# Patient Record
Sex: MALE | Race: WHITE | NOT HISPANIC OR LATINO | URBAN - METROPOLITAN AREA
[De-identification: names, ages, dates, MRNs, and addresses within clinical notes are randomized per-mention and may not be internally consistent; named-entity substitution may affect disease eponyms.]

---

## 2019-11-11 ENCOUNTER — EMERGENCY (EMERGENCY)
Facility: HOSPITAL | Age: 64
LOS: 1 days | Discharge: HOME | End: 2019-11-11
Attending: EMERGENCY MEDICINE | Admitting: EMERGENCY MEDICINE
Payer: COMMERCIAL

## 2019-11-11 VITALS
HEART RATE: 91 BPM | RESPIRATION RATE: 18 BRPM | SYSTOLIC BLOOD PRESSURE: 184 MMHG | DIASTOLIC BLOOD PRESSURE: 94 MMHG | OXYGEN SATURATION: 97 % | TEMPERATURE: 98 F | WEIGHT: 225.09 LBS | HEIGHT: 68 IN

## 2019-11-11 PROCEDURE — 99282 EMERGENCY DEPT VISIT SF MDM: CPT

## 2019-11-11 PROCEDURE — 99283 EMERGENCY DEPT VISIT LOW MDM: CPT

## 2019-11-11 RX ORDER — ASPIRIN/CALCIUM CARB/MAGNESIUM 324 MG
1 TABLET ORAL
Qty: 0 | Refills: 0 | DISCHARGE

## 2019-11-11 NOTE — ED PROVIDER NOTE - OBJECTIVE STATEMENT
65yo male who presents with urinary retention pt was at the game today and started to feel pressure with dribbling, and since 8pm has not been able to urinate, no fever, vomiting, pt has no hx of BPH, no other compalints

## 2019-11-11 NOTE — ED PROVIDER NOTE - PSYCHIATRIC, MLM
Alert and oriented to person, place, time/situation. normal mood and affect. no apparent risk to self or others.
negative...

## 2019-11-11 NOTE — ED PROVIDER NOTE - PROGRESS NOTE DETAILS
pt put out 900cc urine, clear, feeling much better, pt to be d/c home follow up with urology, return if any symtpoms worsen

## 2019-11-11 NOTE — ED ADULT NURSE NOTE - CHIEF COMPLAINT QUOTE
dribbling since 2000/ suprapubic pain   no history of urinary retention dribbling since 2000/ suprapubic pain   no history of urinary retention  recent prostate exam unremarkable

## 2019-11-11 NOTE — ED ADULT TRIAGE NOTE - CHIEF COMPLAINT QUOTE
dribbling since 2000/ suprapubic pain   no history of urinary retention dribbling since 2000/ suprapubic pain   no history of urinary retention  recent prostate exam, unremarkable

## 2019-11-11 NOTE — ED PROVIDER NOTE - PATIENT PORTAL LINK FT
You can access the FollowMyHealth Patient Portal offered by  by registering at the following website: [email or web address]. By joining [employer]’s FollowMyHealth portal, you will also be able to view your health information using other applications (apps) compatible with our system.

## 2021-02-10 PROBLEM — E78.00 PURE HYPERCHOLESTEROLEMIA, UNSPECIFIED: Chronic | Status: ACTIVE | Noted: 2019-11-11

## 2021-02-18 PROBLEM — Z00.00 ENCOUNTER FOR PREVENTIVE HEALTH EXAMINATION: Status: ACTIVE | Noted: 2021-02-18

## 2021-02-24 ENCOUNTER — APPOINTMENT (OUTPATIENT)
Dept: OTOLARYNGOLOGY | Facility: CLINIC | Age: 66
End: 2021-02-24
Payer: COMMERCIAL

## 2021-02-24 VITALS
WEIGHT: 217 LBS | HEIGHT: 68 IN | BODY MASS INDEX: 32.89 KG/M2 | TEMPERATURE: 97.1 F | HEART RATE: 78 BPM | DIASTOLIC BLOOD PRESSURE: 78 MMHG | SYSTOLIC BLOOD PRESSURE: 126 MMHG

## 2021-02-24 DIAGNOSIS — N40.0 BENIGN PROSTATIC HYPERPLASIA WITHOUT LOWER URINARY TRACT SYMPMS: ICD-10-CM

## 2021-02-24 DIAGNOSIS — Z78.9 OTHER SPECIFIED HEALTH STATUS: ICD-10-CM

## 2021-02-24 DIAGNOSIS — Z80.9 FAMILY HISTORY OF MALIGNANT NEOPLASM, UNSPECIFIED: ICD-10-CM

## 2021-02-24 DIAGNOSIS — E78.00 PURE HYPERCHOLESTEROLEMIA, UNSPECIFIED: ICD-10-CM

## 2021-02-24 PROCEDURE — 99072 ADDL SUPL MATRL&STAF TM PHE: CPT

## 2021-02-24 PROCEDURE — 69210 REMOVE IMPACTED EAR WAX UNI: CPT

## 2021-02-24 PROCEDURE — 99213 OFFICE O/P EST LOW 20 MIN: CPT | Mod: 25

## 2021-02-24 RX ORDER — ROSUVASTATIN CALCIUM 5 MG/1
TABLET, FILM COATED ORAL
Refills: 0 | Status: ACTIVE | COMMUNITY

## 2021-02-24 RX ORDER — TAMSULOSIN HYDROCHLORIDE 0.4 MG/1
CAPSULE ORAL
Refills: 0 | Status: ACTIVE | COMMUNITY

## 2021-02-24 NOTE — ADDENDUM
[FreeTextEntry1] : Documented by Shelley Franks acting as scribe for Dr. Valencia on 02/24/2021.\par \par All Medical record entries made by the Scribe were at my, Dr. Valencia, direction and personally dictated by me on 02/24/2021 . I have reviewed the chart and agree that the record accurately reflects my personal performance of the history, physical exam, assessment and plan. I have also personally directed, reviewed, and agreed with the discharge instructions.

## 2021-02-24 NOTE — CONSULT LETTER
[Dear  ___] : Dear  [unfilled], [Courtesy Letter:] : I had the pleasure of seeing your patient, [unfilled], in my office today. [Please see my note below.] : Please see my note below. [Consult Closing:] : Thank you very much for allowing me to participate in the care of this patient.  If you have any questions, please do not hesitate to contact me. [Sincerely,] : Sincerely, [FreeTextEntry3] : Claude Valencia MD FACS

## 2021-02-24 NOTE — HISTORY OF PRESENT ILLNESS
[de-identified] : The patient presents with h/o sleep apnea for which he uses AutoPAP. Pt reports doing well. He uses q-tips occasionally.

## 2021-02-24 NOTE — PHYSICAL EXAM
[Hearing Amaya Test (Tuning Fork On Forehead)] : no lateralization of tone [] : septum deviated to the left [Normal] : no rashes [FreeTextEntry8] : narrow, cerumen impaction removed via curettage, peroxide drops and suction [FreeTextEntry9] : narrow, cerumen impaction removed via curettage, peroxide drops and suction [FreeTextEntry1] : tonsils class 1, uvula edema, type 3 oral cavity [FreeTextEntry2] : sinuses nontender to percussion

## 2021-02-24 NOTE — ASSESSMENT
[FreeTextEntry1] : Reviewed and reconciled medications, allergies, PMHx, PSHx, SocHx, FMHx.\par \par h/o sleep apnea - uses AutoPAP\par bilateral cerumen impactions\par \par compliance report 11/24/2020-2/21/2021: compliant, AHI 1.2, used 92% of the time, avg use 5 hours, avg pressure 7 cm\par \par Plan:\par Cerumen removed. Reviewed compliance report. Continue AutoPAP. FU 6 months for cerumen removal.

## 2021-09-22 ENCOUNTER — APPOINTMENT (OUTPATIENT)
Dept: OTOLARYNGOLOGY | Facility: CLINIC | Age: 66
End: 2021-09-22
Payer: COMMERCIAL

## 2021-09-22 VITALS
SYSTOLIC BLOOD PRESSURE: 118 MMHG | HEIGHT: 68 IN | HEART RATE: 63 BPM | BODY MASS INDEX: 33.34 KG/M2 | DIASTOLIC BLOOD PRESSURE: 77 MMHG | WEIGHT: 220 LBS

## 2021-09-22 PROCEDURE — 99213 OFFICE O/P EST LOW 20 MIN: CPT | Mod: 25

## 2021-09-22 PROCEDURE — 69210 REMOVE IMPACTED EAR WAX UNI: CPT

## 2021-09-22 NOTE — PHYSICAL EXAM
[Hearing Loss Right Only] : normal [Hearing Loss Left Only] : normal [FreeTextEntry8] : cerumen impaction removed by curette and suction, narrowed distal canal [FreeTextEntry9] : cerumen impaction removed by curette and suction, narrowed distal canal [] : septum deviated bilaterally [de-identified] : penelopeamed [Normal] : lingual tonsils are normal [de-identified] : elongated uvula. type 3 oral cavity [de-identified] : small tonsils

## 2021-09-22 NOTE — ASSESSMENT
[FreeTextEntry1] : pt with sleep apnea and recalled machine. trying to get new machine.  discussed inspire in combo with uvula reduction but pt not interested in surgery. compliance report reviewed\par \par cerumen bilaterally removed by curettage and suction\par \par fu 6 months continue cpap

## 2021-09-22 NOTE — HISTORY OF PRESENT ILLNESS
[de-identified] : Patient on cpap using a recalled machine. without machine tired.  Trying to get machine from insurance. Interested in possible inspire. Does not want surgery however. \par \par compliance report reviewed with pt.using about 80% for 4.5 hours overall with good reduction of ahi to 1.6

## 2022-03-25 ENCOUNTER — APPOINTMENT (OUTPATIENT)
Dept: OTOLARYNGOLOGY | Facility: CLINIC | Age: 67
End: 2022-03-25
Payer: COMMERCIAL

## 2022-03-25 ENCOUNTER — TRANSCRIPTION ENCOUNTER (OUTPATIENT)
Age: 67
End: 2022-03-25

## 2022-03-25 VITALS
HEIGHT: 68 IN | HEART RATE: 65 BPM | BODY MASS INDEX: 34.1 KG/M2 | DIASTOLIC BLOOD PRESSURE: 77 MMHG | WEIGHT: 225 LBS | SYSTOLIC BLOOD PRESSURE: 135 MMHG

## 2022-03-25 PROCEDURE — 69210 REMOVE IMPACTED EAR WAX UNI: CPT

## 2022-03-25 PROCEDURE — 99213 OFFICE O/P EST LOW 20 MIN: CPT | Mod: 25

## 2022-03-25 NOTE — ADDENDUM
[FreeTextEntry1] : Documented by Lj Up acting as scribe for Dr. Valencia on 03/25/2022. \par \par All Medical record entries made by the scribe were at my. Dr. Valencia direction and personally dictated by me on 03/25/2022. I have reviewed the chart and agree that the record accurately reflects my personal performance of the history, physical exam, assessment and plan. I have also personally directed, reviewed, and agreed with the discharge instructions.

## 2022-03-25 NOTE — HISTORY OF PRESENT ILLNESS
[de-identified] : The patient presents with h/o sleep apnea - using CPAP intermittently. machine was recalled.  did not qualify per patient for immediate replacement but will review email again. Is tired the days he doesn't use machine. The patient presents today with ear clogging. His wife complains about his hearing.  Pt reports using the CPAP for 3 years now.

## 2022-03-25 NOTE — PHYSICAL EXAM
[Hearing Amaya Test (Tuning Fork On Forehead)] : no lateralization of tone [] : septum deviated to the left [Midline] : trachea located in midline position [Normal] : orientation to person, place, and time: normal [Hearing Loss Right Only] : normal [Hearing Loss Left Only] : normal [FreeTextEntry8] : cerumen impaction removed via curettage - narrow canal  [FreeTextEntry9] : cerumen impaction removed via curettage  [de-identified] : mildly inflamed turbs - little blood on left septum  [de-identified] : edematous pale uvula  [de-identified] : class 1 and lateral  [de-identified] : type 3 oral cavity  [FreeTextEntry2] : sinuses nontender to percussion sensations intact

## 2022-06-24 ENCOUNTER — APPOINTMENT (OUTPATIENT)
Dept: ORTHOPEDIC SURGERY | Facility: CLINIC | Age: 67
End: 2022-06-24
Payer: COMMERCIAL

## 2022-06-24 VITALS — WEIGHT: 225 LBS | HEIGHT: 68 IN | BODY MASS INDEX: 34.1 KG/M2

## 2022-06-24 DIAGNOSIS — M65.4 RADIAL STYLOID TENOSYNOVITIS [DE QUERVAIN]: ICD-10-CM

## 2022-06-24 PROCEDURE — 99213 OFFICE O/P EST LOW 20 MIN: CPT | Mod: 25

## 2022-06-24 PROCEDURE — 20550 NJX 1 TENDON SHEATH/LIGAMENT: CPT

## 2022-06-24 NOTE — PHYSICAL EXAM
[Left] : left hand [First Dorsal Compartment] : first dorsal compartment [1st] : 1st [CMC Joint] : CMC joint [] : positive Finkelstein's [de-identified] : mild first CMC tenderness

## 2022-06-24 NOTE — PROCEDURE
[Tendon Sheath] : tendon sheath [Left] : of the left [De Juanita's] : arianne urbano's [Pain] : pain [Inflammation] : inflammation [Alcohol] : alcohol [Ethyl Chloride sprayed topically] : ethyl chloride sprayed topically [Sterile technique used] : sterile technique used [___ cc    1%] : Lidocaine ~Vcc of 1%  [___ cc    10mg] : Triamcinolone (Kenalog) ~Vcc of 10 mg

## 2022-06-24 NOTE — HISTORY OF PRESENT ILLNESS
[8] : 8 [0] : 0 [Sharp] : sharp [Intermittent] : intermittent [Nothing helps with pain getting better] : Nothing helps with pain getting better [de-identified] : 65 year old male followed for LEFT wrist DeQuervain's synovitis. 4 months s/p third CSI with significant relief. Acute\par exacerbation of symptoms.\par   [] : no [FreeTextEntry1] : LT hand and Wrist [FreeTextEntry5] : Hx of tendonitis

## 2022-10-03 ENCOUNTER — APPOINTMENT (OUTPATIENT)
Dept: OTOLARYNGOLOGY | Facility: CLINIC | Age: 67
End: 2022-10-03

## 2022-10-03 VITALS
BODY MASS INDEX: 33.49 KG/M2 | DIASTOLIC BLOOD PRESSURE: 69 MMHG | HEART RATE: 62 BPM | WEIGHT: 221 LBS | HEIGHT: 68 IN | SYSTOLIC BLOOD PRESSURE: 112 MMHG

## 2022-10-03 PROCEDURE — 99213 OFFICE O/P EST LOW 20 MIN: CPT | Mod: 25

## 2022-10-03 PROCEDURE — 69210 REMOVE IMPACTED EAR WAX UNI: CPT

## 2022-10-03 NOTE — CONSULT LETTER
[Dear  ___] : Dear  [unfilled], [Courtesy Letter:] : I had the pleasure of seeing your patient, [unfilled], in my office today. [Please see my note below.] : Please see my note below. [Consult Closing:] : Thank you very much for allowing me to participate in the care of this patient.  If you have any questions, please do not hesitate to contact me. [Sincerely,] : Sincerely, [FreeTextEntry1] : Claude Valencia MD FACS

## 2022-10-03 NOTE — HISTORY OF PRESENT ILLNESS
[de-identified] : Pt. with h/o sleep apnea - using CPAP intermittently presents today for a 6 month follow up. Patient states he just got a new CPAP about 2/3 weeks ago (uses nasal pillows). He states if he wakes up in the middle of the night to use the bathroom he doesn’t wanna put the CPAP back on. Patient states he uses Q-tips occasionally.

## 2022-10-03 NOTE — PHYSICAL EXAM
[Hearing Amaya Test (Tuning Fork On Forehead)] : no lateralization of tone [Midline] : trachea located in midline position [Normal] : no rashes [FreeTextEntry8] : cerumen impaction removed via curettage  [FreeTextEntry1] : -deviated septum left greater than right along the floor - non obstructive [de-identified] : class 1  [de-identified] : type 3 oral cavity [FreeTextEntry2] : sensations intact. sinuses nontender to percussion  [de-identified] : GAIL

## 2022-10-03 NOTE — ASSESSMENT
[FreeTextEntry1] : Reviewed and reconciled medications, allergies, PMHx, PSHx, SocHx, FMHx \par \par Pt. with h/o sleep apnea - using CPAP intermittently presents today for a 6 month follow up. Patient states he just got a new CPAP about 2/3 weeks ago (uses nasal pillows). He states if he wakes up in the middle of the night to use the bathroom he doesn’t wanna put the CPAP back on. Patient states he uses Q-tips occasionally. \par \par Sleep Study 07/29/22 - 09/29/22:\par - average use 4 hours 36 minutes\par -uses 85% of the time\par - AHI 1.2\par \par Physical Exam:\par Right Ear: cerumen impaction removed via curettage\par Left Ear: cerumen impaction removed via curettage \par -deviated septum left greater than right along the floor - non obstructive\par -tonsils class 1 \par -type 3 oral cavity\par \par Plan: cerumen impaction removed b/l via curettage. No Q-Tips. FU 6 months.

## 2023-04-03 ENCOUNTER — APPOINTMENT (OUTPATIENT)
Dept: OTOLARYNGOLOGY | Facility: CLINIC | Age: 68
End: 2023-04-03
Payer: MEDICARE

## 2023-04-03 VITALS
DIASTOLIC BLOOD PRESSURE: 82 MMHG | HEART RATE: 75 BPM | BODY MASS INDEX: 34.1 KG/M2 | WEIGHT: 225 LBS | HEIGHT: 68 IN | SYSTOLIC BLOOD PRESSURE: 118 MMHG

## 2023-04-03 PROCEDURE — G0268 REMOVAL OF IMPACTED WAX MD: CPT

## 2023-04-03 PROCEDURE — 99213 OFFICE O/P EST LOW 20 MIN: CPT | Mod: 25

## 2023-04-03 PROCEDURE — 92557 COMPREHENSIVE HEARING TEST: CPT

## 2023-04-03 PROCEDURE — 92567 TYMPANOMETRY: CPT

## 2023-04-03 NOTE — PHYSICAL EXAM
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 sensations intact.

## 2023-04-03 NOTE — HISTORY OF PRESENT ILLNESS
[de-identified] : Pt presents with h/o sleep apnea - using CPAP presents today for a 6 month follow up ear cleaning and compliance report. Denies ears feeling clogged. Pt notes his wife thinks he has a loss of hearing. Pt notes he notices a decrease in hearing with background noise.

## 2023-04-03 NOTE — DATA REVIEWED
[de-identified] : \par -TYMPS: TYPE A AU \par -AD: HEARING ESSENTIALLY WNL/ BORDERLINE -8000 HZ, WITH A MILD SNHL AT 3000 HZ\par -AS: HEARING WNL/ BORDERLINE -2000 HZ TO A MILD SNHL 7430-1320 HZ\par *NOTE ASYMM 6-8KHZ*

## 2023-04-03 NOTE — ADDENDUM
[FreeTextEntry1] : Documented by Jessy Liang acting as scribe for Dr. Valencia on 04/03/2023.\par \par All Medical record entries made by the scribe were at my, Dr. Valencia,direction and personally dictated by me on 04/03/2023. I have reviewed the chart and agree that the record accurately reflects my personal performance of the history, physical exam, assessment and plan. I have also personally directed, reviewed, and agreed with the discharge instructions.

## 2023-04-03 NOTE — ASSESSMENT
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 Reviewed compliance report. Audio - results interpreted by Dr. Valencia and reviewed with the patient. Continue using CPAP. FU 6 months.

## 2023-08-04 ENCOUNTER — APPOINTMENT (OUTPATIENT)
Dept: ORTHOPEDIC SURGERY | Facility: CLINIC | Age: 68
End: 2023-08-04
Payer: MEDICARE

## 2023-08-04 VITALS — HEIGHT: 68 IN | WEIGHT: 225 LBS | BODY MASS INDEX: 34.1 KG/M2

## 2023-08-04 PROCEDURE — 99213 OFFICE O/P EST LOW 20 MIN: CPT

## 2023-08-04 PROCEDURE — 73030 X-RAY EXAM OF SHOULDER: CPT | Mod: RT

## 2023-08-04 NOTE — DISCUSSION/SUMMARY
[de-identified] : Discussed the nature of the diagnosis and risk and benefits of different modalities of treatment. X-rays reviewed.  Rotator cuff tendonitis.  Start PT.  RTO 3 weeks.

## 2023-08-04 NOTE — HISTORY OF PRESENT ILLNESS
[3] : 3 [0] : 0 [Dull/Aching] : dull/aching [Intermittent] : intermittent [Nothing helps with pain getting better] : Nothing helps with pain getting better [de-identified] : 67-year-old male presenting with RT arm weakness. He reports he wakes up in the morning having difficulty moving the arm. Pain with elevation of the shoulder. Symptoms for the past 3-4 months. No injury/trauma.  [] : no [FreeTextEntry1] : right shoulder, right elbow  [FreeTextEntry5] : Pt states he is unable to move is arms in the morning. Pt has no strength and has to sit up and hold his arm until motor function returns. Pt has little pain,

## 2023-08-04 NOTE — PHYSICAL EXAM
[Right] : right shoulder [] : no erythema [FreeTextEntry9] : internal rotation to T11  [de-identified] : pain resisted external rotation and resisted abduction.  [de-identified] : negative cross over sign   [FreeTextEntry1] : rotator cuff degenerative changes, calcification within the cuff  [TWNoteComboBox7] : active forward flexion 170 degrees [de-identified] : active abduction 180 degrees [de-identified] : external rotation 70 degrees

## 2023-08-25 ENCOUNTER — APPOINTMENT (OUTPATIENT)
Dept: ORTHOPEDIC SURGERY | Facility: CLINIC | Age: 68
End: 2023-08-25
Payer: MEDICARE

## 2023-08-25 VITALS — HEIGHT: 68 IN | BODY MASS INDEX: 34.1 KG/M2 | WEIGHT: 225 LBS

## 2023-08-25 DIAGNOSIS — M75.81 OTHER SHOULDER LESIONS, RIGHT SHOULDER: ICD-10-CM

## 2023-08-25 PROCEDURE — 99213 OFFICE O/P EST LOW 20 MIN: CPT

## 2023-08-25 NOTE — HISTORY OF PRESENT ILLNESS
[3] : 3 [0] : 0 [Dull/Aching] : dull/aching [Intermittent] : intermittent [Nothing helps with pain getting better] : Nothing helps with pain getting better [de-identified] : 67 year old male followed for Tendinitis of right rotator cuff. He has been attending PT. He has had some progress with PT. Improved ROm. He is not resolved.    [] : no [FreeTextEntry1] : right shoulder, right elbow  [FreeTextEntry5] : started PT with some improvement, reports some soreness today.

## 2023-08-25 NOTE — PHYSICAL EXAM
[Right] : right shoulder [] : no erythema [FreeTextEntry9] : internal rotation to T11  [de-identified] : pain resisted external rotation and resisted abduction.  [de-identified] : negative cross over sign   [TWNoteComboBox7] : active forward flexion 170 degrees [de-identified] : active abduction 180 degrees [de-identified] : external rotation 70 degrees

## 2023-08-25 NOTE — DISCUSSION/SUMMARY
[de-identified] : Discussed the nature of the diagnosis and risk and benefits of different modalities of treatment. Discussed continued PT vs MRI and consultation with shoulder service.  Will obtain RT shoulder MRI.  RTO with Dr. Hernadez.

## 2023-08-28 ENCOUNTER — APPOINTMENT (OUTPATIENT)
Dept: MRI IMAGING | Facility: CLINIC | Age: 68
End: 2023-08-28
Payer: MEDICARE

## 2023-08-28 PROCEDURE — 73221 MRI JOINT UPR EXTREM W/O DYE: CPT | Mod: RT

## 2023-09-01 ENCOUNTER — APPOINTMENT (OUTPATIENT)
Dept: ORTHOPEDIC SURGERY | Facility: CLINIC | Age: 68
End: 2023-09-01
Payer: MEDICARE

## 2023-09-01 VITALS — BODY MASS INDEX: 34.1 KG/M2 | HEIGHT: 68 IN | WEIGHT: 225 LBS

## 2023-09-01 DIAGNOSIS — M75.41 IMPINGEMENT SYNDROME OF RIGHT SHOULDER: ICD-10-CM

## 2023-09-01 PROCEDURE — 99214 OFFICE O/P EST MOD 30 MIN: CPT

## 2023-09-01 NOTE — IMAGING
[de-identified] : No swelling, no ecchymosis, no meme deformity Tenderness to palpation over greater tuberosity No instability or tenderness over AC joint 170/80/60/40 5-/5 supraspinatus, 5/5 infraspinatus and subscapularis; there is pain with strength testing Positive Juan test, positive impingement sign Speeds and yergason negative Motor and sensory intact distally

## 2023-09-01 NOTE — HISTORY OF PRESENT ILLNESS
[3] : 3 [1] : 2 [Dull/Aching] : dull/aching [de-identified] : 09/01/2023: Patient presents today for a new injury visit for the R shoulder. states he has been having pain from 1 month. denies any injury. was seeing dr john who sent for mri.  [] : Post Surgical Visit: no [FreeTextEntry1] : R shoulder

## 2023-09-01 NOTE — PROCEDURE
[FreeTextEntry3] : - Large joint injection was performed of the right subacromial space.  - The indication for this procedure was pain and inflammation. Patient has tried OTC's including aspirin, Ibuprofen, Aleve, etc or prescription NSAIDS, and/or exercises at home and/or physical therapy without satisfactory response, patient had decreased mobility in the joint and the risks benefits, and alternatives have been discussed, and verbal consent was obtained. The site was prepped with alcohol, betadine, ethyl chloride sprayed topically and sterile technique used.  - An injection of Betamethasone 2cc; Marcaine 5cc injected. - Patient was advised to call if redness, pain or fever occur, apply ice for 15 minutes out of every hour for the next 12-24 hours as tolerated and patient was advised to rest the joint(s) for 2 days.  - Ultrasound guidance was indicated for this patient due to prior failure or difficult injection. All ultrasound images have been permanently captured and stored accordingly in our picture archiving and communication system. Visualization of the needle and placement of injection was performed without complication.

## 2023-09-01 NOTE — ASSESSMENT
[FreeTextEntry1] : I personally reviewed and interpreted all MRI images and notes from the previous doctors.  Atraumatic right shoulder pain for approximately 2 or 3 months.  Some improvement with physical therapy but symptoms persist.  Not symptomatic enough at this time with no night pain.  Reviewed treatment options.  MRI demonstrating classic impingement syndrome with no significant rotator cuff tear aside from degenerative tearing of the rotator cuff.  We went over the findings in detail.  Corticosteroid injection administered to the subacromial space today.  Continued physical therapy advised.  Follow-up in 6 weeks to reassess.

## 2023-10-09 ENCOUNTER — APPOINTMENT (OUTPATIENT)
Dept: OTOLARYNGOLOGY | Facility: CLINIC | Age: 68
End: 2023-10-09
Payer: MEDICARE

## 2023-10-09 VITALS
WEIGHT: 225 LBS | HEART RATE: 56 BPM | DIASTOLIC BLOOD PRESSURE: 83 MMHG | SYSTOLIC BLOOD PRESSURE: 136 MMHG | HEIGHT: 68 IN | BODY MASS INDEX: 34.1 KG/M2

## 2023-10-09 PROCEDURE — 99213 OFFICE O/P EST LOW 20 MIN: CPT | Mod: 25

## 2023-10-09 PROCEDURE — 69210 REMOVE IMPACTED EAR WAX UNI: CPT

## 2024-04-08 ENCOUNTER — APPOINTMENT (OUTPATIENT)
Dept: OTOLARYNGOLOGY | Facility: CLINIC | Age: 69
End: 2024-04-08
Payer: MEDICARE

## 2024-04-08 VITALS
HEIGHT: 68 IN | WEIGHT: 230 LBS | BODY MASS INDEX: 34.86 KG/M2 | HEART RATE: 69 BPM | DIASTOLIC BLOOD PRESSURE: 80 MMHG | SYSTOLIC BLOOD PRESSURE: 124 MMHG

## 2024-04-08 DIAGNOSIS — H61.23 IMPACTED CERUMEN, BILATERAL: ICD-10-CM

## 2024-04-08 DIAGNOSIS — H90.3 SENSORINEURAL HEARING LOSS, BILATERAL: ICD-10-CM

## 2024-04-08 DIAGNOSIS — G47.33 OBSTRUCTIVE SLEEP APNEA (ADULT) (PEDIATRIC): ICD-10-CM

## 2024-04-08 DIAGNOSIS — J34.2 DEVIATED NASAL SEPTUM: ICD-10-CM

## 2024-04-08 DIAGNOSIS — H90.5 UNSPECIFIED SENSORINEURAL HEARING LOSS: ICD-10-CM

## 2024-04-08 DIAGNOSIS — K13.79 OTHER LESIONS OF ORAL MUCOSA: ICD-10-CM

## 2024-04-08 DIAGNOSIS — J31.0 CHRONIC RHINITIS: ICD-10-CM

## 2024-04-08 PROCEDURE — G0268 REMOVAL OF IMPACTED WAX MD: CPT

## 2024-04-08 PROCEDURE — 99214 OFFICE O/P EST MOD 30 MIN: CPT | Mod: 25

## 2024-04-08 PROCEDURE — 92567 TYMPANOMETRY: CPT

## 2024-04-08 PROCEDURE — 92557 COMPREHENSIVE HEARING TEST: CPT

## 2024-04-08 NOTE — PHYSICAL EXAM
[Hearing Amaya Test (Tuning Fork On Forehead)] : no lateralization of tone [] : septum deviated bilaterally [Midline] : trachea located in midline position [Normal] : salivary glands are normal [Hearing Loss Right Only] : normal [Hearing Loss Left Only] : normal [FreeTextEntry8] :  cerumen impaction removed via curettage [FreeTextEntry9] :  cerumen impaction removed via curettage [de-identified] : negative mike maneuver [de-identified] :  mildly inflamed turbinates [de-identified] : class 1 [de-identified] : elongated uvula. type 3 oral cavity. leukoplakia on the cheeks [FreeTextEntry2] :  sinuses nontender to percussion.  sensations intact [de-identified] : pupils equal and reactive

## 2024-04-08 NOTE — ASSESSMENT
[FreeTextEntry1] :  Reviewed and reconciled medications, allergies, PMHx, PSHx, SocHx, FMHx.  Pt. with h/o sleep apnea - using AutoPAP and b/l HF SNHL (left slightly worse than right) presents today for a follow up. He states that he is doing well. He states that sometimes he feels like he doesn't get a great night of sleep with his new AutoPAP machine.  Aprill 2023 audio: -b/l HF SNHL (left slightly worse than right)  Compliance report: -setting 6-20 (average 6.2) -averages 7 hours per night -95% usage -AHI 1.4  Physical exam: -right ear canal: cerumen impaction removed via curettage -left ear canal: cerumen impaction removed via curettage -no lateralization to tuning forks -deviated septum -mildly inflamed turbinates -negative mike maneuver -class 1 tonsils -type 3 oral cavity -leukoplakia on the cheeks -pupils equal and reactive  Audio: -AD: HWNL sloping to an essential mild SNHL 250-8000 Hz -AS: HWNL sloping to a moderate-severe HF SNHL 250-8000 Hz -100% understanding in the right ear at 55 dB -96% understanding in the left ear at 55 dB  Plan:  Audio - results interpreted by Dr. Valencia and reviewed with the patient. -Ordered adjustments for AutoPAP settings -Ordered ABR -FU with results from ABR

## 2024-04-08 NOTE — ADDENDUM
[FreeTextEntry1] :  Documented by Jd Gordon acting as scribe for Dr. Valencia on 04/08/2024. All Medical record entries made by the Scribe were at my, Dr. Valencia, direction and personally dictated by me on 04/08/2024 . I have reviewed the chart and agree that the record accurately reflects my personal performance of the history, physical exam, assessment and plan. I have also personally directed, reviewed, and agreed with the discharge instructions.

## 2024-04-08 NOTE — HISTORY OF PRESENT ILLNESS
[de-identified] : Pt. with h/o sleep apnea - using AutoPAP and b/l HF SNHL (left slightly worse than right) presents today for a follow up. He states that he is doing well. He states that sometimes he feels like he doesn't get a great night of sleep with his new AutoPAP machine.

## 2024-04-12 ENCOUNTER — APPOINTMENT (OUTPATIENT)
Dept: OTOLARYNGOLOGY | Facility: CLINIC | Age: 69
End: 2024-04-12

## 2024-04-15 ENCOUNTER — NON-APPOINTMENT (OUTPATIENT)
Age: 69
End: 2024-04-15

## 2024-04-16 ENCOUNTER — APPOINTMENT (OUTPATIENT)
Dept: OTOLARYNGOLOGY | Facility: CLINIC | Age: 69
End: 2024-04-16
Payer: MEDICARE

## 2024-04-16 PROCEDURE — 92653 AEP NEURODIAGNOSTIC I&R: CPT

## 2024-05-31 NOTE — ED PROVIDER NOTE - MDM PATIENT STATEMENT FOR ADDL TREATMENT
ED Purposeful Rounding: Patient updated with plan of care. Patient pain, bathroom needs, positioning and comfort (warm blankets, dim lights, TV, Bed positioning, personal items) needs addressed. Patient given and instructed on use of call light.      Patient with one or more new problems requiring additional work-up/treatment.

## 2024-10-14 ENCOUNTER — APPOINTMENT (OUTPATIENT)
Dept: OTOLARYNGOLOGY | Facility: CLINIC | Age: 69
End: 2024-10-14
Payer: MEDICARE

## 2024-10-14 VITALS
HEIGHT: 67 IN | SYSTOLIC BLOOD PRESSURE: 123 MMHG | BODY MASS INDEX: 37.12 KG/M2 | HEART RATE: 57 BPM | DIASTOLIC BLOOD PRESSURE: 73 MMHG | WEIGHT: 236.5 LBS

## 2024-10-14 DIAGNOSIS — K13.79 OTHER LESIONS OF ORAL MUCOSA: ICD-10-CM

## 2024-10-14 DIAGNOSIS — H90.3 SENSORINEURAL HEARING LOSS, BILATERAL: ICD-10-CM

## 2024-10-14 DIAGNOSIS — H61.23 IMPACTED CERUMEN, BILATERAL: ICD-10-CM

## 2024-10-14 DIAGNOSIS — J31.0 CHRONIC RHINITIS: ICD-10-CM

## 2024-10-14 DIAGNOSIS — K13.21 LEUKOPLAKIA OF ORAL MUCOSA, INCLUDING TONGUE: ICD-10-CM

## 2024-10-14 DIAGNOSIS — J34.2 DEVIATED NASAL SEPTUM: ICD-10-CM

## 2024-10-14 DIAGNOSIS — G47.33 OBSTRUCTIVE SLEEP APNEA (ADULT) (PEDIATRIC): ICD-10-CM

## 2024-10-14 PROCEDURE — 99214 OFFICE O/P EST MOD 30 MIN: CPT | Mod: 25

## 2024-10-14 PROCEDURE — 69210 REMOVE IMPACTED EAR WAX UNI: CPT

## 2025-04-14 ENCOUNTER — APPOINTMENT (OUTPATIENT)
Dept: OTOLARYNGOLOGY | Facility: CLINIC | Age: 70
End: 2025-04-14
Payer: MEDICARE

## 2025-04-14 VITALS
HEART RATE: 63 BPM | WEIGHT: 238 LBS | SYSTOLIC BLOOD PRESSURE: 126 MMHG | BODY MASS INDEX: 37.35 KG/M2 | HEIGHT: 67 IN | DIASTOLIC BLOOD PRESSURE: 77 MMHG

## 2025-04-14 DIAGNOSIS — G47.33 OBSTRUCTIVE SLEEP APNEA (ADULT) (PEDIATRIC): ICD-10-CM

## 2025-04-14 DIAGNOSIS — H90.3 SENSORINEURAL HEARING LOSS, BILATERAL: ICD-10-CM

## 2025-04-14 DIAGNOSIS — H90.5 UNSPECIFIED SENSORINEURAL HEARING LOSS: ICD-10-CM

## 2025-04-14 DIAGNOSIS — H61.23 IMPACTED CERUMEN, BILATERAL: ICD-10-CM

## 2025-04-14 DIAGNOSIS — J34.2 DEVIATED NASAL SEPTUM: ICD-10-CM

## 2025-04-14 DIAGNOSIS — J31.0 CHRONIC RHINITIS: ICD-10-CM

## 2025-04-14 DIAGNOSIS — R43.0 ANOSMIA: ICD-10-CM

## 2025-04-14 PROCEDURE — 99213 OFFICE O/P EST LOW 20 MIN: CPT | Mod: 25

## 2025-04-14 PROCEDURE — 31231 NASAL ENDOSCOPY DX: CPT

## 2025-04-14 PROCEDURE — 69210 REMOVE IMPACTED EAR WAX UNI: CPT

## 2025-04-29 ENCOUNTER — APPOINTMENT (OUTPATIENT)
Dept: MRI IMAGING | Facility: CLINIC | Age: 70
End: 2025-04-29
Payer: MEDICARE

## 2025-04-29 PROCEDURE — 70553 MRI BRAIN STEM W/O & W/DYE: CPT
